# Patient Record
Sex: MALE | Race: OTHER | Employment: OTHER | URBAN - NONMETROPOLITAN AREA
[De-identification: names, ages, dates, MRNs, and addresses within clinical notes are randomized per-mention and may not be internally consistent; named-entity substitution may affect disease eponyms.]

---

## 2022-09-15 ENCOUNTER — OFFICE VISIT (OUTPATIENT)
Dept: CARDIOLOGY | Age: 77
End: 2022-09-15
Payer: OTHER GOVERNMENT

## 2022-09-15 VITALS
BODY MASS INDEX: 30.39 KG/M2 | HEART RATE: 70 BPM | DIASTOLIC BLOOD PRESSURE: 70 MMHG | WEIGHT: 178 LBS | SYSTOLIC BLOOD PRESSURE: 118 MMHG | HEIGHT: 64 IN

## 2022-09-15 DIAGNOSIS — R06.09 DOE (DYSPNEA ON EXERTION): Primary | ICD-10-CM

## 2022-09-15 PROCEDURE — 99204 OFFICE O/P NEW MOD 45 MIN: CPT | Performed by: INTERNAL MEDICINE

## 2022-09-15 PROCEDURE — 1123F ACP DISCUSS/DSCN MKR DOCD: CPT | Performed by: INTERNAL MEDICINE

## 2022-09-15 PROCEDURE — 93005 ELECTROCARDIOGRAM TRACING: CPT | Performed by: INTERNAL MEDICINE

## 2022-09-15 PROCEDURE — 93010 ELECTROCARDIOGRAM REPORT: CPT | Performed by: INTERNAL MEDICINE

## 2022-09-15 RX ORDER — TAMSULOSIN HYDROCHLORIDE 0.4 MG/1
0.4 CAPSULE ORAL DAILY
COMMUNITY
Start: 2022-04-14

## 2022-09-15 RX ORDER — LORATADINE 10 MG/1
10 TABLET ORAL DAILY
COMMUNITY
Start: 2022-02-10

## 2022-09-15 RX ORDER — LISINOPRIL 5 MG/1
2.5 TABLET ORAL DAILY
COMMUNITY
Start: 2022-02-10

## 2022-09-15 RX ORDER — ASPIRIN 81 MG/1
81 TABLET ORAL DAILY
COMMUNITY
Start: 2022-02-10

## 2022-09-15 RX ORDER — MAGNESIUM OXIDE 400 MG/1
400 TABLET ORAL DAILY
COMMUNITY
Start: 2022-02-10

## 2022-09-15 RX ORDER — FLUOXETINE HYDROCHLORIDE 20 MG/1
1 CAPSULE ORAL DAILY
COMMUNITY
Start: 2022-07-16

## 2022-09-15 RX ORDER — ALBUTEROL SULFATE 90 UG/1
AEROSOL, METERED RESPIRATORY (INHALATION) PRN
COMMUNITY
Start: 2022-07-12

## 2022-09-15 RX ORDER — LATANOPROST 50 UG/ML
1 SOLUTION/ DROPS OPHTHALMIC NIGHTLY
COMMUNITY
Start: 2021-09-23

## 2022-09-15 RX ORDER — ACETAMINOPHEN 325 MG/1
650 TABLET ORAL PRN
COMMUNITY
Start: 2022-02-10

## 2022-09-15 RX ORDER — ROSUVASTATIN CALCIUM 40 MG/1
20 TABLET, COATED ORAL DAILY
COMMUNITY
Start: 2022-07-29

## 2022-09-15 RX ORDER — METOPROLOL SUCCINATE 25 MG/1
12.5 TABLET, EXTENDED RELEASE ORAL DAILY
COMMUNITY
Start: 2022-02-10

## 2022-09-15 RX ORDER — ISOSORBIDE MONONITRATE 60 MG/1
30 TABLET, EXTENDED RELEASE ORAL DAILY
COMMUNITY
Start: 2022-02-10

## 2022-09-15 RX ORDER — CLOPIDOGREL BISULFATE 75 MG/1
75 TABLET ORAL DAILY
COMMUNITY
Start: 2022-02-10 | End: 2022-10-28

## 2022-09-15 RX ORDER — MEGESTROL ACETATE 40 MG/ML
SUSPENSION ORAL DAILY
COMMUNITY
Start: 2021-10-05

## 2022-09-15 NOTE — PROGRESS NOTES
Cardiology Consultation/Follow Up. Nilo Tomlinson  1945  [id number]    Today: 9/15/22    CC: Patient is here for new consult    HPI:   Madelin Bradshaw  is here for new consult. Sent to us by [place] for possible cath. Apparently he was found to have a  of his left circumflex and OM with good collaterals. He was then sent over to the  clinic at [facility]. Apparently they decided not to proceed with possible  PCI. [facility] wanted him to get a second opinion. He is here today for the second opinion. He denies any complaints of chest pain, orthopnea, PND, lower extremity edema. Past Medical:  Past Medical History:   Diagnosis Date    Cardiomyopathy [facility])     Chronic renal insufficiency     Hyperlipidemia     Lung cancer ([facility] [address]     2019 left    MI (myocardial infarction) ([facility] [address]     Primary hypertension     PTSD (post-traumatic stress disorder)          Past Surgical:  Past Surgical History:   Procedure Laterality Date    LUNG CANCER SURGERY           Family History:  No family history on file. Social History:  Social History     Socioeconomic History    Marital status: Single     Spouse name: None    Number of children: None    Years of education: None    Highest education level: None   Tobacco Use    Smoking status: Every Day     Packs/day: 0.50     Types: Cigarettes    Smokeless tobacco: Never   Substance and Sexual Activity    Alcohol use: Never    Drug use: Yes     Frequency: 7.0 times per week     Types: Marijuana (Weed)     Comment: 3 joints daily       REVIEW OF SYSTEMS:    Constitutional: there has been no unanticipated weight loss. There's been No change in energy level, No change in activity level. Eyes: No visual changes or diplopia. No scleral icterus. ENT: No Headaches, hearing loss or vertigo. No mouth sores or sore throat. Cardiovascular: AS HPI  Respiratory: AS HPI  Gastrointestinal: No abdominal pain, appetite loss, blood in stools.  No change in bowel or bladder habits. Genitourinary: No dysuria, trouble voiding, or hematuria. Musculoskeletal:  No gait disturbance, No weakness or joint complaints. Integumentary: No rash or pruritis. Neurological: No headache, diplopia, change in muscle strength, numbness or tingling. No change in gait, balance, coordination, mood, affect, memory, mentation, behavior. Psychiatric: No new anxiety or depression. Endocrine: No temperature intolerance. No excessive thirst, fluid intake, or urination. No tremor. Hematologic/Lymphatic: No abnormal bruising or bleeding, blood clots or swollen lymph nodes. Allergic/Immunologic: No nasal congestion or hives.     Medications:    Current Outpatient Medications:     acetaminophen (TYLENOL) 325 MG tablet, Take 650 mg by mouth as needed, Disp: , Rfl:     aspirin 81 MG EC tablet, Take 81 mg by mouth daily, Disp: , Rfl:     clopidogrel (PLAVIX) 75 MG tablet, Take 75 mg by mouth daily, Disp: , Rfl:     diclofenac sodium (VOLTAREN) 1 % GEL, Apply topically as needed, Disp: , Rfl:     FLUoxetine (PROZAC) 20 MG capsule, Take 1 capsule by mouth daily, Disp: , Rfl:     tamsulosin (FLOMAX) 0.4 MG capsule, Take 0.4 mg by mouth daily, Disp: , Rfl:     albuterol sulfate HFA (PROVENTIL;VENTOLIN;PROAIR) 108 (90 Base) MCG/ACT inhaler, Inhale into the lungs as needed, Disp: , Rfl:     isosorbide mononitrate (IMDUR) 60 MG extended release tablet, Take 30 mg by mouth daily, Disp: , Rfl:     latanoprost (XALATAN) 0.005 % ophthalmic solution, Place 1 drop into both eyes at bedtime, Disp: , Rfl:     loratadine (CLARITIN) 10 MG tablet, Take 10 mg by mouth daily, Disp: , Rfl:     rosuvastatin (CRESTOR) 40 MG tablet, Take 20 mg by mouth daily, Disp: , Rfl:     lisinopril (PRINIVIL;ZESTRIL) 5 MG tablet, Take 2.5 mg by mouth daily, Disp: , Rfl:     magnesium oxide (MAG-OX) 400 MG tablet, Take 400 mg by mouth daily, Disp: , Rfl:     megestrol (MEGACE) 40 MG/ML suspension, Take by mouth daily 4 tsp daily, Disp: , Rfl:     metoprolol succinate (TOPROL XL) 25 MG extended release tablet, Take 12.5 mg by mouth daily, Disp: , Rfl:     Multiple Vitamins-Minerals (EYE VITAMINS & MINERALS PO), Take 1 tablet by mouth daily, Disp: , Rfl:     Multiple Vitamins-Minerals (MULTIVITAMIN ADULTS PO), Take 1 tablet by mouth daily, Disp: , Rfl:      Physical Exam:   Vitals: /70 (Site: Left Upper Arm, Position: Sitting, Cuff Size: Medium Adult)   Pulse 70   Ht 5' 4\" (1.626 m)   Wt 178 lb (80.7 kg)   BMI 30.55 kg/m²   General appearance: alert and cooperative with exam  HEENT: Head: Normocephalic, no lesions, without obvious abnormality. Neck: no carotid bruit, no JVD  Lungs: clear to auscultation bilaterally  Heart: regular rate and rhythm, S1, S2 normal, no Murmur  Abdomen: soft, non-tender; bowel sounds normal; no masses,  no organomegaly  Extremities: no site injection hematoma, extremities normal, atraumatic, no cyanosis. no edema  Neurologic: Mental status: Alert, oriented, thought content appropriate    Labs:  No results found for: CHOL, TRIG, HDL, LDLCHOLESTEROL, LDLCALC, LABVLDL, VLDL, CHOLHDLRATIO    No results found for: NA, K, CL, CO2, BUN, CREATININE, GLUCOSE, CALCIUM, PROT, LABALBU, BILITOT, ALKPHOS, AST, ALT, LABGLOM, GFRAA, AGRATIO, GLOB    EKG: Sinus  Rhythm  -Short [place] syndrome   Fernandez = 106  -Right bundle branch block with left axis -bifascicular block.    -Left atrial enlargement.    -  Nonspecific T-abnormality. Cath on 2/22-   LAD 20%, LCX and OM occlusion with collaterals, RCA 60-75%, and 50% PLB     Echo: 4/13/2022   Left Ventricle: Systolic function is moderately decreased with an ejection fraction of 35-40%. See wall score diagram for wall motion abnormalities. Grade I diastolic dysfunction (impaired relaxation) is present. Lateral E' is 2.72 cm/s. Medial E' is 3.59 cm/s. Average E' is 22.9 cm/s. Mitral Valve: There is trace regurgitation. There is no evidence of mitral valve stenosis. Tricuspid Valve: There is trace regurgitation. There is no evidence of tricuspid valve stenosis. Pulmonic Valve: There is trace regurgitation. There is no evidence of pulmonic valve stenosis. Viability on 5/22:  Impression:   Findings consistent with some residual viable tissue in the apex and lateral ventricle. However I suspect there multiple areas of scar formation from prior infarcts. Past Medical and Surgical History, Problem List, Allergies, Medications, Labs, Imaging, all reviewed extensively in EMR and with the patient. Assessment:  -Known coronary artery disease with left circumflex and OM complete occlusion with collaterals. Moderate RCA disease. Currently denies any angina.  -Hypertension  -Dyslipidemia  -Ischemic cardiomyopathy  -Possible viability in the apex and lateral wall, however there was concern more of scar formation from prior infarcts    Plan:  -Unfortunately I do not have the images available to show to one of our interventional operators. I did discuss with him the fact that a  PCI does not necessarily need to be attempted, especially given that he does not have any angina currently and is stable on medical therapy. He agrees at this time to pursue medical therapy. We will reevaluate him in 4 weeks.  -Check 2D echo    The patient is to continue heart healthy diet, weight loss and exercise as tolerated. Patient's medications and side effects were discussed. Medication refills were provided if needed. Follow up appointment timing was discussed. All questions and concerns were addressed to patient's satisfaction. The patient is to follow up in 1 months or sooner if necessary. Thank you for allowing me to participate in the care of this patient, please do not hesitate to call if you have any questions. Lavella Burkitt, DO, [facility], [address], [address], [place]  [facility]  [facility]. [facility]  [phone]

## 2022-09-30 ENCOUNTER — HOSPITAL ENCOUNTER (OUTPATIENT)
Dept: NON INVASIVE DIAGNOSTICS | Age: 77
Discharge: HOME | End: 2022-09-30
Payer: OTHER GOVERNMENT

## 2022-09-30 DIAGNOSIS — R06.09 DOE (DYSPNEA ON EXERTION): ICD-10-CM

## 2022-09-30 PROCEDURE — 93308 TTE F-UP OR LMTD: CPT

## 2022-10-23 ENCOUNTER — HOSPITAL ENCOUNTER (EMERGENCY)
Age: 77
Discharge: HOME | End: 2022-10-23
Attending: EMERGENCY MEDICINE
Payer: MEDICARE

## 2022-10-23 VITALS
SYSTOLIC BLOOD PRESSURE: 130 MMHG | HEIGHT: 64 IN | HEART RATE: 97 BPM | TEMPERATURE: 98.2 F | BODY MASS INDEX: 29.02 KG/M2 | WEIGHT: 170 LBS | RESPIRATION RATE: 18 BRPM | OXYGEN SATURATION: 97 % | DIASTOLIC BLOOD PRESSURE: 87 MMHG

## 2022-10-23 DIAGNOSIS — I10 HYPERTENSION, UNSPECIFIED TYPE: Primary | ICD-10-CM

## 2022-10-23 PROCEDURE — 99282 EMERGENCY DEPT VISIT SF MDM: CPT

## 2022-10-23 ASSESSMENT — LIFESTYLE VARIABLES
HOW MANY STANDARD DRINKS CONTAINING ALCOHOL DO YOU HAVE ON A TYPICAL DAY: PATIENT DOES NOT DRINK
HOW OFTEN DO YOU HAVE A DRINK CONTAINING ALCOHOL: NEVER

## 2022-10-23 ASSESSMENT — PAIN - FUNCTIONAL ASSESSMENT: PAIN_FUNCTIONAL_ASSESSMENT: NONE - DENIES PAIN

## 2022-10-23 NOTE — DISCHARGE INSTRUCTIONS
Take an extra dose of lisinopril or an extra dose of your Toprol if blood pressure not controlled after waiting for an hour and resting  Follow-up with family physician for reevaluation  Return immediately if any worsening symptoms or any other concerns    Tell us how we did visit: [email or web address]   and let us know about your experience

## 2022-10-23 NOTE — ED PROVIDER NOTES
[facility] ED  [address]  Phone: [phone]  [place]      Pt Name: Gerrianne Aase  MRN: [id number]  [place] 1945  Date of evaluation: 10/23/2022    CHIEF COMPLAINT       Chief Complaint   Patient presents with    Shortness of Breath       HISTORY OF PRESENT ILLNESS    Gerrianne Aase is a 68 y.o. male who presents to the emergency department with chronic shortness of breath but primarily here because his blood pressure was elevated this morning at 360 systolic. On arrival he is 130/70 on reevaluation initially 156/67. He denied any chest pain or shortness of breath. No fevers or chills no cough or congestion. No blurry vision or double vision no headache. Patient states that when he is blood pressure was elevated earlier this morning he felt a little lightheaded but otherwise has been fine since. He took his blood pressure medications at noon several hours after the elevated blood pressure and he presents normotensive. He thinks that maybe his blood pressure cuff might be off. REVIEW OF SYSTEMS       Constitutional: No fevers or chills   HEENT: No sore throat, rhinorrhea, or earache   Eyes: No blurry vision or double vision no drainage   Cardiovascular: No chest pain or tachycardia   Respiratory: No wheezing or shortness of breath no cough   Gastrointestinal: No nausea, vomiting, diarrhea, constipation, or abdominal pain   : No hematuria or dysuria   Musculoskeletal: No swelling or pain   Skin: No rash   Neurological: No focal neurologic complaints, paresthesias, weakness, or headache     PAST MEDICAL HISTORY    has a past medical history of Cardiomyopathy ([address] Chronic renal insufficiency, Hyperlipidemia, Lung cancer ([address] MI (myocardial infarction) ([facility] [address] Primary hypertension, and PTSD (post-traumatic stress disorder). SURGICAL HISTORY      has a past surgical history that includes Lung cancer surgery.     CURRENT MEDICATIONS Previous Medications    ACETAMINOPHEN (TYLENOL) 325 MG TABLET    Take 650 mg by mouth as needed    ALBUTEROL SULFATE HFA (PROVENTIL;VENTOLIN;PROAIR) 108 (90 BASE) MCG/ACT INHALER    Inhale into the lungs as needed    ASPIRIN 81 MG EC TABLET    Take 81 mg by mouth daily    CLOPIDOGREL (PLAVIX) 75 MG TABLET    Take 75 mg by mouth daily    DICLOFENAC SODIUM (VOLTAREN) 1 % GEL    Apply topically as needed    FLUOXETINE (PROZAC) 20 MG CAPSULE    Take 1 capsule by mouth daily    ISOSORBIDE MONONITRATE (IMDUR) 60 MG EXTENDED RELEASE TABLET    Take 30 mg by mouth daily    LATANOPROST (XALATAN) 0.005 % OPHTHALMIC SOLUTION    Place 1 drop into both eyes at bedtime    LISINOPRIL (PRINIVIL;ZESTRIL) 5 MG TABLET    Take 2.5 mg by mouth daily    LORATADINE (CLARITIN) 10 MG TABLET    Take 10 mg by mouth daily    MAGNESIUM OXIDE (MAG-OX) 400 MG TABLET    Take 400 mg by mouth daily    MEGESTROL (MEGACE) 40 MG/ML SUSPENSION    Take by mouth daily 4 tsp daily    METOPROLOL SUCCINATE (TOPROL XL) 25 MG EXTENDED RELEASE TABLET    Take 12.5 mg by mouth daily    MULTIPLE VITAMINS-MINERALS (EYE VITAMINS & MINERALS PO)    Take 1 tablet by mouth daily    MULTIPLE VITAMINS-MINERALS (MULTIVITAMIN ADULTS PO)    Take 1 tablet by mouth daily    ROSUVASTATIN (CRESTOR) 40 MG TABLET    Take 20 mg by mouth daily    TAMSULOSIN (FLOMAX) 0.4 MG CAPSULE    Take 0.4 mg by mouth daily       ALLERGIES     has No Known Allergies. FAMILY HISTORY     has no family status information on file. family history is not on file. SOCIAL HISTORY      reports that he has been smoking cigarettes. He has been smoking an average of .5 packs per day. He has never used smokeless tobacco. He reports current drug use. Frequency: 7.00 times per week. Drug: Marijuana Duke Bourgeois). He reports that he does not drink alcohol.     PHYSICAL EXAM       ED Triage Vitals [10/23/22 1744]   BP Temp Temp src Heart Rate Resp SpO2 Height Weight   (!) 156/67 -- -- 91 18 99 % 5' 4\" (1.626 m) 170 lb (77.1 kg)       Constitutional: Alert, oriented x3, nontoxic, answering questions appropriately, acting properly for age, in no acute distress   HEENT: Extraocular muscles intact,   Neck: Trachea midline   Cardiovascular: Regular rhythm and rate no murmurs   Respiratory: Clear to auscultation bilaterally no wheezes, rhonchi, rales, no respiratory distress no tachypnea no retractions no hypoxia  Gastrointestinal: Soft, nontender, nondistended, positive bowel sounds. No rebound, rigidity, or guarding. Musculoskeletal: No extremity pain or swelling   Neurologic: Moving all 4 extremities without difficulty there are no gross focal neurologic deficits   Skin: Warm and dry       DIFFERENTIAL DIAGNOSIS/ MDM:     Blood pressure repeat 130/70 feeling fine no symptoms. Advised that he can take an extra dose of lisinopril or Toprol if his blood pressure is high and he is not getting improvement after an hours worth of rest.  Follow-up with family physician for reevaluation and return if worsening symptoms or any other concerns. DIAGNOSTIC RESULTS     EKG: All EKG's are interpreted by the Emergency Department Physician who either signs or Co-signs this chart in the absence of a cardiologist.        Not indicated unless otherwise documented above    LABS:  No results found for this visit on 10/23/22. Not indicated unless otherwise documented above    RADIOLOGY:   I reviewed the radiologist interpretations:    No orders to display       Not indicated unless otherwise documented above    EMERGENCY DEPARTMENT COURSE:     The patient was given the following medications:  No orders of the defined types were placed in this encounter.        Vitals:   -------------------------  BP (!) 156/67   Pulse 91   Resp 18   Ht 5' 4\" (1.626 m)   Wt 170 lb (77.1 kg)   SpO2 99%   BMI 29.18 kg/m²         The patient understands that at this time there is no evidence for a more malignant underlying process, but also understands that early in the process of an illness or injury, an emergency department workup can be falsely reassuring. Routine discharge counseling was given, and it is understood that worsening, changing or persistent symptoms should prompt an immediate call or follow up with their primary physician or return to the emergency department. The importance of appropriate follow up was also discussed. I have reviewed the disposition diagnosis. I have answered the questions and given discharge instructions. There was voiced understanding of these instructions and no further questions or complaints. CRITICAL CARE:    None    CONSULTS:    None    PROCEDURES:    None      OARRS Report if indicated             FINAL IMPRESSION      1.  Hypertension, unspecified type          DISPOSITION/PLAN   DISPOSITION Decision To Discharge 10/23/2022 06:05:30 PM        CONDITION ON DISPOSITION: STABLE       PATIENT REFERRED TO:  Terri Riley MD  [address]  [employer] [facility] [address] [phone]    Schedule an appointment as soon as possible for a visit in 2 days      DISCHARGE MEDICATIONS:  New Prescriptions    No medications on file       (Please note that portions of this note were completed with a voice recognition program.  Efforts were made to edit the dictations but occasionally words are mis-transcribed.)    Morteza Morrison DO   Attending Emergency Physician     Morteza Morrison DO  10/23/22 [id number]

## 2022-10-28 ENCOUNTER — OFFICE VISIT (OUTPATIENT)
Dept: CARDIOLOGY | Age: 77
End: 2022-10-28
Payer: OTHER GOVERNMENT

## 2022-10-28 VITALS — SYSTOLIC BLOOD PRESSURE: 110 MMHG | DIASTOLIC BLOOD PRESSURE: 60 MMHG | OXYGEN SATURATION: 97 % | HEART RATE: 76 BPM

## 2022-10-28 DIAGNOSIS — I25.10 CAD IN NATIVE ARTERY: Primary | ICD-10-CM

## 2022-10-28 PROCEDURE — 99213 OFFICE O/P EST LOW 20 MIN: CPT | Performed by: SURGERY

## 2022-10-28 PROCEDURE — 99214 OFFICE O/P EST MOD 30 MIN: CPT | Performed by: SURGERY

## 2022-10-28 PROCEDURE — 1123F ACP DISCUSS/DSCN MKR DOCD: CPT | Performed by: SURGERY

## 2022-10-28 RX ORDER — CLOPIDOGREL BISULFATE 75 MG/1
75 TABLET ORAL DAILY
COMMUNITY

## 2022-10-28 NOTE — PROGRESS NOTES
Today's Date: 10/28/2022  Patient's Name: Nikolay Hernandez  Patient's age: 68 y.o., 1945    Subjective: The patient is a 68y.o. year old, , male  with history of CAD, lung cancer is in the office for follow up on echo . Has some shortness of breath with activity but not worsened than before , denies chest pain   Family History:  History reviewed. No pertinent family history. Past Medical History:   has a past medical history of Cardiomyopathy ([facility] [address] Chronic renal insufficiency, Hyperlipidemia, Lung cancer ([facility] [address] MI (myocardial infarction) ([facility] [address] Primary hypertension, and PTSD (post-traumatic stress disorder). Past Surgical History:   has a past surgical history that includes Lung cancer surgery. Home Medications:  Prior to Admission medications    Medication Sig Start Date End Date Taking?  Authorizing Provider   acetaminophen (TYLENOL) 325 MG tablet Take 650 mg by mouth as needed 2/10/22  Yes Historical Provider, MD   aspirin 81 MG EC tablet Take 81 mg by mouth daily 2/10/22  Yes Historical Provider, MD   diclofenac sodium (VOLTAREN) 1 % GEL Apply topically as needed 12/3/21  Yes Historical Provider, MD   FLUoxetine (PROZAC) 20 MG capsule Take 1 capsule by mouth daily 7/16/22  Yes Historical Provider, MD   tamsulosin (FLOMAX) 0.4 MG capsule Take 0.4 mg by mouth daily 4/14/22  Yes Historical Provider, MD   albuterol sulfate HFA (PROVENTIL;VENTOLIN;PROAIR) 108 (90 Base) MCG/ACT inhaler Inhale into the lungs as needed 7/12/22  Yes Historical Provider, MD   isosorbide mononitrate (IMDUR) 60 MG extended release tablet Take 30 mg by mouth daily 2/10/22  Yes Historical Provider, MD   latanoprost (XALATAN) 0.005 % ophthalmic solution Place 1 drop into both eyes at bedtime 9/23/21  Yes Historical Provider, MD   loratadine (CLARITIN) 10 MG tablet Take 10 mg by mouth daily 2/10/22  Yes Historical Provider, MD   rosuvastatin (CRESTOR) 40 MG tablet Take 20 mg by mouth daily 7/29/22  Yes Historical Provider, MD   lisinopril (PRINIVIL;ZESTRIL) 5 MG tablet Take 2.5 mg by mouth daily 2/10/22  Yes Historical Provider, MD   magnesium oxide (MAG-OX) 400 MG tablet Take 400 mg by mouth daily 2/10/22  Yes Historical Provider, MD   megestrol (MEGACE) 40 MG/ML suspension Take by mouth daily 4 tsp daily 10/5/21  Yes Historical Provider, MD   metoprolol succinate (TOPROL XL) 25 MG extended release tablet Take 12.5 mg by mouth daily 2/10/22  Yes Historical Provider, MD   Multiple Vitamins-Minerals (EYE VITAMINS & MINERALS PO) Take 1 tablet by mouth daily   Yes Historical Provider, MD   Multiple Vitamins-Minerals (MULTIVITAMIN ADULTS PO) Take 1 tablet by mouth daily   Yes Historical Provider, MD       Allergies:  Patient has no known allergies. Social History:   reports that he has been smoking cigarettes. He has been smoking an average of .5 packs per day. He has never used smokeless tobacco. He reports current drug use. Frequency: 7.00 times per week. Drug: Marijuana Roxanne Palm). He reports that he does not drink alcohol. Review of Systems:  CONSTITUTIONAL: well nourished   CARDIOVASCULAR: No chest pain, Yes dyspnea on exertion, No palpitations. No lower extremity edema. RESPIRATORY: shortness of breath with activity   GASTROINTESTINAL:  negative  GENITOURINARY:  negative  INTEGUMENT:  negative  MUSCULOSKELETAL:  negative  NEUROLOGICAL:  negative    Physical Exam:  /60 (Site: Right Upper Arm, Position: Sitting)   Pulse 76   SpO2 97%   HEENT: PERRL, no cervical lymphadenopathy. No masses palpable. Cardiovascular: The apical impulse is not displaced  Heart  Sounds:    Jugular venous pulsation Normal  Peripheral pulses are symmetrical and full  Respiratory: Good respiratory effort.  On auscultation: clear to auscultation bilaterally and diminished breath sounds base - right  Abdomen:  No masses or tenderness  Bowel sounds present  Extremities:   No Cyanosis or Clubbing   Lower extremity edema: No  Skin: Warm and dry    Cardiac Data:  10/28 2022 EKG: not obtained - had one in September and refused      EKG: Sinus  Rhythm  -Short [place] syndrome   Fernandez = 106  -Right bundle branch block with left axis -bifascicular block.    -Left atrial enlargement.    -  Nonspecific T-abnormality. Cath on 2/22-   LAD 20%, LCX and OM occlusion with collaterals, RCA 60-75%, and 50% PLB      Echo: 4/13/2022   Left Ventricle: Systolic function is moderately decreased with an ejection fraction of 35-40%. See wall score diagram for wall motion abnormalities. Grade I diastolic dysfunction (impaired relaxation) is present. Lateral E' is 2.72 cm/s. Medial E' is 3.59 cm/s. Average E' is 22.9 cm/s. Mitral Valve: There is trace regurgitation. There is no evidence of mitral valve stenosis. Tricuspid Valve: There is trace regurgitation. There is no evidence of tricuspid valve stenosis. Pulmonic Valve: There is trace regurgitation. There is no evidence of pulmonic valve stenosis. Limited ECHO Summary 9/30/2022  Technically difficult study. Limited 2-D echo per doctor order. Normal left ventricular dimension. Ejection fraction is 45%. Viability on 5/22:  Impression:   Findings consistent with some residual viable tissue in the apex and lateral ventricle. However I suspect there multiple areas of scar formation from prior infarcts. Labs:     CBC: No results for input(s): WBC, HGB, HCT, PLT in the last 72 hours. BMP: No results for input(s): NA, K, CO2, BUN, CREATININE, LABGLOM, GLUCOSE in the last 72 hours. PT/INR: No results for input(s): PROTIME, INR in the last 72 hours. FASTING LIPID PANEL:No results found for: HDL, LDLDIRECT, LDLCALC, TRIG  LIVER PROFILE:No results for input(s): AST, ALT, LABALBU in the last 72 hours. IMPRESSION:    There is no problem list on file for this patient. RECOMMENDATIONS:  -Known coronary artery disease with left circumflex and OM complete occlusion with collaterals. Moderate RCA disease. Currently denies any angina, continue asa, plavix , BB and statin   - Ischemic cardiomyopathy- EF improved to 45% on recent ECHO   - Lung cancer per primary   - current smoker - smoking cessation but patient not willing to quit         NOY Murphy - GRISELDA, MD  [facility] Cardiology Consult           [phone]